# Patient Record
Sex: MALE | Race: WHITE
[De-identification: names, ages, dates, MRNs, and addresses within clinical notes are randomized per-mention and may not be internally consistent; named-entity substitution may affect disease eponyms.]

---

## 2018-12-15 ENCOUNTER — HOSPITAL ENCOUNTER (EMERGENCY)
Dept: HOSPITAL 74 - FER | Age: 23
Discharge: HOME | End: 2018-12-15
Payer: COMMERCIAL

## 2018-12-15 VITALS — TEMPERATURE: 98.4 F | HEART RATE: 66 BPM | DIASTOLIC BLOOD PRESSURE: 60 MMHG | SYSTOLIC BLOOD PRESSURE: 110 MMHG

## 2018-12-15 VITALS — BODY MASS INDEX: 27.4 KG/M2

## 2018-12-15 DIAGNOSIS — Z85.47: ICD-10-CM

## 2018-12-15 DIAGNOSIS — E86.0: ICD-10-CM

## 2018-12-15 DIAGNOSIS — K52.9: Primary | ICD-10-CM

## 2018-12-15 LAB
ALBUMIN SERPL-MCNC: 3.9 G/DL (ref 3.5–5)
ALP SERPL-CCNC: 50 U/L (ref 32–92)
ALT SERPL-CCNC: 25 U/L (ref 10–40)
ANION GAP SERPL CALC-SCNC: 6 MMOL/L (ref 8–16)
AST SERPL-CCNC: 24 U/L (ref 10–42)
BASOPHILS # BLD: 1.1 % (ref 0–2)
BILIRUB SERPL-MCNC: 0.5 MG/DL (ref 0.2–1)
BUN SERPL-MCNC: 20 MG/DL (ref 7–18)
CALCIUM SERPL-MCNC: 8.7 MG/DL (ref 8.4–10.2)
CHLORIDE SERPL-SCNC: 104 MMOL/L (ref 98–107)
CO2 SERPL-SCNC: 27 MMOL/L (ref 22–28)
CREAT SERPL-MCNC: 0.9 MG/DL (ref 0.6–1.3)
DEPRECATED RDW RBC AUTO: 11.8 % (ref 11.9–15.9)
EOSINOPHIL # BLD: 5.3 % (ref 0–4.5)
GLUCOSE SERPL-MCNC: 86 MG/DL (ref 74–106)
HCT VFR BLD CALC: 44.9 % (ref 35.4–49)
HGB BLD-MCNC: 14.5 GM/DL (ref 11.7–16.9)
INR BLD: 1.11 (ref 0.82–1.09)
LIPASE SERPL-CCNC: 123 U/L (ref 73–393)
LYMPHOCYTES # BLD: 31.1 % (ref 8–40)
MCH RBC QN AUTO: 29.9 PG (ref 25.7–33.7)
MCHC RBC AUTO-ENTMCNC: 32.4 G/DL (ref 32–35.9)
MCV RBC: 92.4 FL (ref 80–96)
MONOCYTES # BLD AUTO: 9.4 % (ref 3.8–10.2)
NEUTROPHILS # BLD: 53.1 % (ref 42.8–82.8)
PH UR: 6 [PH] (ref 4.5–8)
PLATELET # BLD AUTO: 130 K/MM3 (ref 134–434)
PMV BLD: 10.7 FL (ref 7.5–11.1)
POTASSIUM SERPLBLD-SCNC: 3.7 MMOL/L (ref 3.5–5.1)
PROT SERPL-MCNC: 6.8 G/DL (ref 6.4–8.3)
PT PNL PPP: 12.4 SEC (ref 10.2–13)
RBC # BLD AUTO: 4.86 M/MM3 (ref 4–5.6)
SODIUM SERPL-SCNC: 137 MMOL/L (ref 136–145)
SP GR UR: >= 1.03 (ref 1.01–1.03)
UROBILINOGEN UR STRIP-MCNC: 1 MG/DL (ref 0.2–1)
WBC # BLD AUTO: 5.2 K/MM3 (ref 4–10.8)

## 2018-12-15 PROCEDURE — 3E0337Z INTRODUCTION OF ELECTROLYTIC AND WATER BALANCE SUBSTANCE INTO PERIPHERAL VEIN, PERCUTANEOUS APPROACH: ICD-10-PCS

## 2018-12-15 NOTE — PDOC
History of Present Illness





- General


History Source: Patient


Exam Limitations: No Limitations





- History of Present Illness


Initial Comments: 





12/15/18 21:48





The patient is a 23 year old male, with no past medical history,, who presents 

to the emergency department with increasing periumbilical abdominal pain with 

associated madelaine colored stools since Monday. He states he returned from 

[place] on Wednesday, but developed his symptoms on Monday while in [place] after 

a night of drinking alcohol outside of his resort. He states he does not drink 

alcohol regularly, however, was with friends and drank a large amount of 

alcohol on Sunday night. He states his stools are formed, nonbloody, and madelaine 

colored as he points to the beige wall as reference. He states he has has 

decreased appetite since. He reportedly ate pizza at noon which prompted a 

bowel movement. He states eating anything makes him move his bowels. He states 

he drank pedialyte last night for hydration. He denies drinking from rivers or 

streams. 





The patient denies chest pain, shortness of breath, headache and dizziness. The 

patient denies fever, chills, nausea, vomit, diarrhea and constipation. The 

patient denies dysuria, frequency, urgency and hematuria. 





Allergies: Penicillins


PCP -  Dr. Spaulding











<Kusum Ruiz - Last Filed: 12/15/18 21:48>





<Marianela Cotto - Last Filed: 12/16/18 06:02>





- General


Chief Complaint: Diarrhea


Stated Complaint: ABDOMINAL PAIN X 6 DAYS


Time Seen by Provider: 12/15/18 21:16





Past History





<Kusum Ruiz - Last Filed: 12/15/18 21:48>





- Past Medical History


Cancer: Yes (TESTICULAR @ AGE 5)


COPD: No





- Immunization History


Td Vaccination: Yes


Immunization Up to Date: Yes





- Suicide/Smoking/Psychosocial Hx


Smoking Status: Yes


Smoking History: Never smoked


Number of Cigarettes Smoked Daily: 5


Hx Alcohol Use: Yes


Drug/Substance Use Hx: No


Substance Use Type: None


Hx Substance Use Treatment: No





<Marianela Cotto - Last Filed: 12/16/18 06:02>





- Past Medical History


Allergies/Adverse Reactions: 


 Allergies











Allergy/AdvReac Type Severity Reaction Status Date / Time


 


Penicillins Allergy   Verified 12/15/18 21:10











Home Medications: 


Ambulatory Orders





No Home Medications 0 dose .ROUTE UTDICT 02/01/13 











**Review of Systems





- Review of Systems


Able to Perform ROS?: Yes


Comments:: 





12/15/18 21:48


CONSTITUTIONAL:


(+) decreased appetite.Absent: fever, no chills, no fatigue


EYES:


Absent: visual changes


ENT:


Absent: ear pain, no sore throat


CARDIOVASCULAR:


Absent: chest pain, no palpitations


RESPIRATORY:


Absent: cough, no SOB


GI:


(+) abdominal pain, madelaine colored stool, Absent:  no nausea, no vomiting, no 

constipation, no diarrhea


GENITOURINARY:


Absent: dysuria, no frequency, no hematuria


MUSKULOSKELETAL:


Absent: back pain, no arthralgia, no myalgia


SKIN:


Absent: rash


NEURO:


Absent: headache








<Kusum Ruiz - Last Filed: 12/15/18 21:48>





*Physical Exam





- Vital Signs


 Last Vital Signs











Temp Pulse Resp BP Pulse Ox


 


 98.4 F   66   16   110/60   99 


 


 12/15/18 21:09  12/15/18 21:09  12/15/18 21:09  12/15/18 21:09  12/15/18 21:09














- Physical Exam


Comments: 





12/15/18 21:49





GENERAL: The patient is awake, alert, and fully oriented, in no acute distress.


HEAD: Normal with no signs of trauma.


EYES: Pupils equal, round and reactive to light, extraocular movements intact, 

sclera anicteric, conjunctiva clear with no pallor.


ENT: Ears normal, nares patent, oropharynx clear without exudates. Moist mucous 

membranes.


NECK: Normal range of motion, supple without lymphadenopathy, JVD, or masses.


LUNGS: Breath sounds equal, clear to auscultation bilaterally. No wheeze/

crackles.


HEART: Regular rate and rhythm, normal S1 and S2 without murmur or rub.


ABDOMEN:(+) mild tenderness of the epigastric, right upper quadrant and 

periumbilical regions. No iraheta sign. 


 Soft/nondistended. BS wnl. No guarding or rebound. No palpable masses. No 

hepatosplenomegaly.


EXTREMITIES: Normal range of motion, no edema. No clubbing or cyanosis. No cords

, erythema, or tenderness.


NEUROLOGICAL: Cranial nerves II through XII grossly intact. Normal speech, 

normal gait.


PSYCH: Normal mood, normal affect.


SKIN: Warm, Dry, normal turgor, no rashes or lesions noted.














<Kusum Ruiz - Last Filed: 12/15/18 21:48>





- Vital Signs


 Last Vital Signs











Temp Pulse Resp BP Pulse Ox


 


 98.4 F   66   16   110/60   99 


 


 12/15/18 21:09  12/15/18 21:09  12/15/18 21:09  12/15/18 21:09  12/15/18 21:09














<Marianela Cotto - Last Filed: 12/16/18 06:02>





Moderate Sedation





- Procedure Monitoring


Vital Signs: 


Procedure Monitoring Vital Signs











Temperature  98.4 F   12/15/18 21:09


 


Pulse Rate  66   12/15/18 21:09


 


Respiratory Rate  16   12/15/18 21:09


 


Blood Pressure  110/60   12/15/18 21:09


 


O2 Sat by Pulse Oximetry (%)  99   12/15/18 21:09











<Kusum Ruiz - Last Filed: 12/15/18 21:48>





- Procedure Monitoring


Vital Signs: 


Procedure Monitoring Vital Signs











Temperature  98.4 F   12/15/18 21:09


 


Pulse Rate  66   12/15/18 21:09


 


Respiratory Rate  16   12/15/18 21:09


 


Blood Pressure  110/60   12/15/18 21:09


 


O2 Sat by Pulse Oximetry (%)  99   12/15/18 21:09











<Marianela Cotto - Last Filed: 12/16/18 06:02>





ED Treatment Course





- LABORATORY


CBC & Chemistry Diagram: 


 12/15/18 21:40





 12/15/18 21:40





<Marianela Cotto - Last Filed: 12/16/18 06:02>





Progress Note





- Progress Note


Progress Note: 





Documentation has been prepared under my direction and personally reviewed by 

me in its entirety. I attest that this documented accurately reflects all work, 

treatment, procedures and medical decision making performed by me.





<Marianela Cotto - Last Filed: 12/16/18 06:02>





Medical Decision Making





- Medical Decision Making





As noted above, this 23 y.o man presents with upper abdominal pain,decreased 

appetite and madelaine-colored stools. No hx of jaundice.


Exam as noted. No significant abdominal tenderness.


Labs notable for normal WBC; bilirubin and transaminase levels are normal.


The patient is comfortable without further epigastric pain after IV fluid 

administratiom








<Marianela Cotto - Last Filed: 12/16/18 06:02>





*DC/Admit/Observation/Transfer





- Attestations


Scribe Attestion: 





12/15/18 21:49





Documentation prepared by Kusum Ruiz, acting as medical scribe for Marianela Cotto MD





<Kusum Ruiz - Last Filed: 12/15/18 21:48>





<Marianela Cotto - Last Filed: 12/16/18 06:02>


Diagnosis at time of Disposition: 


 Gastroenteritis, Dehydration








- Discharge Dispostion


Disposition: HOME


Condition at time of disposition: Improved





- Referrals


Referrals: 


Guillermo Spaulding MD [Primary Care Provider] - 3 days





- Patient Instructions


Printed Discharge Instructions:  Dehydration


Additional Instructions: 


continue hydration as discussed


advance diet as tolerated


followup with your doctor within 3 days


return to ER if you have worsening pain or develop fever/vomiting








- Post Discharge Activity

## 2019-04-22 ENCOUNTER — EMERGENCY (EMERGENCY)
Facility: HOSPITAL | Age: 24
LOS: 1 days | Discharge: HOME | End: 2019-04-22
Attending: EMERGENCY MEDICINE | Admitting: EMERGENCY MEDICINE
Payer: SELF-PAY

## 2019-04-22 VITALS
HEART RATE: 89 BPM | RESPIRATION RATE: 17 BRPM | DIASTOLIC BLOOD PRESSURE: 85 MMHG | SYSTOLIC BLOOD PRESSURE: 137 MMHG | OXYGEN SATURATION: 99 % | TEMPERATURE: 98 F

## 2019-04-22 VITALS
WEIGHT: 171.96 LBS | OXYGEN SATURATION: 97 % | TEMPERATURE: 98 F | RESPIRATION RATE: 19 BRPM | HEART RATE: 108 BPM | DIASTOLIC BLOOD PRESSURE: 85 MMHG | SYSTOLIC BLOOD PRESSURE: 133 MMHG | HEIGHT: 65 IN

## 2019-04-22 DIAGNOSIS — Z98.890 OTHER SPECIFIED POSTPROCEDURAL STATES: Chronic | ICD-10-CM

## 2019-04-22 PROCEDURE — 70450 CT HEAD/BRAIN W/O DYE: CPT | Mod: 26

## 2019-04-22 PROCEDURE — 90471 IMMUNIZATION ADMIN: CPT

## 2019-04-22 PROCEDURE — 12013 RPR F/E/E/N/L/M 2.6-5.0 CM: CPT

## 2019-04-22 PROCEDURE — 90715 TDAP VACCINE 7 YRS/> IM: CPT

## 2019-04-22 PROCEDURE — 99284 EMERGENCY DEPT VISIT MOD MDM: CPT | Mod: 25

## 2019-04-22 PROCEDURE — 70450 CT HEAD/BRAIN W/O DYE: CPT

## 2019-04-22 RX ORDER — TETANUS TOXOID, REDUCED DIPHTHERIA TOXOID AND ACELLULAR PERTUSSIS VACCINE, ADSORBED 5; 2.5; 8; 8; 2.5 [IU]/.5ML; [IU]/.5ML; UG/.5ML; UG/.5ML; UG/.5ML
0.5 SUSPENSION INTRAMUSCULAR ONCE
Qty: 0 | Refills: 0 | Status: COMPLETED | OUTPATIENT
Start: 2019-04-22 | End: 2019-04-22

## 2019-04-22 RX ADMIN — TETANUS TOXOID, REDUCED DIPHTHERIA TOXOID AND ACELLULAR PERTUSSIS VACCINE, ADSORBED 0.5 MILLILITER(S): 5; 2.5; 8; 8; 2.5 SUSPENSION INTRAMUSCULAR at 04:01

## 2019-04-22 NOTE — ED ADULT NURSE NOTE - CHIEF COMPLAINT QUOTE
patient presents ambulatory to triage, states "I was working on a car and it was lifted and when I went to bring the car down the bar rickashayed and hit me in the head" patient denies loss of consciousness, blurred vision, nausea, vomiting, shortness of breath, palpitations. unknown last tetanus vaccine as per patient   patient sustained laceration to right forehead/scalp line  patient c/o intermittent dizziness since event. patient presents ambulatory to triage, states "I was working on a car and it was lifted and when I went to bring the car down the bar rickashayed and hit me in the head" patient denies loss of consciousness, dizziness, blurred vision, nausea, vomiting, shortness of breath, palpitations. unknown last tetanus vaccine as per patient   patient sustained laceration to right forehead/scalp line

## 2019-04-22 NOTE — ED PROVIDER NOTE - OBJECTIVE STATEMENT
22yo male who presents with head injury and laceration. pt was at work and got hit in the head with a pole attached to a car tow, no LOC, pt c/o dizziness, headache, no vomiting, no nausea

## 2019-04-22 NOTE — ED ADULT NURSE NOTE - CAS ELECT INFOMATION PROVIDED
patient denies pain, dizziness, nausea, vomiting, shortness of breath, headache, blurred vision, chest pain, pain. steady gait noted. pt verbalized understanding of discharge instructions and follow up with primary health care provider and return to ED if symptoms worsen. patient educated to return in one week for suture removal as per Dr Dutton./DC instructions

## 2019-04-22 NOTE — ED ADULT NURSE NOTE - NSIMPLEMENTINTERV_GEN_ALL_ED
Implemented All Universal Safety Interventions:  [place] to call system. Call bell, personal items and telephone within reach. Instruct patient to call for assistance. Room bathroom lighting operational. Non-slip footwear when patient is off stretcher. Physically safe environment: no spills, clutter or unnecessary equipment. Stretcher in lowest position, wheels locked, appropriate side rails in place.

## 2019-04-22 NOTE — ED PROVIDER NOTE - CARE PLAN
Principal Discharge DX:	Laceration of forehead, right, complicated, initial encounter Principal Discharge DX:	Laceration of forehead, right, complicated, initial encounter  Secondary Diagnosis:	Head injuries, initial encounter

## 2019-04-22 NOTE — ED PROVIDER NOTE - PRINCIPAL DIAGNOSIS
all other ROS negative except as per HPI
Laceration of forehead, right, complicated, initial encounter

## 2019-04-22 NOTE — ED ADULT NURSE NOTE - OBJECTIVE STATEMENT
patient presents ambulatory to triage, states "I was working on a car and it was lifted and when I went to bring the car down the bar rickashayed and hit me in the head" patient denies loss of consciousness, dizziness, blurred vision, nausea, vomiting, shortness of breath, palpitations. unknown last tetanus vaccine as per patient   patient sustained laceration to right forehead/scalp line

## 2019-04-22 NOTE — ED ADULT TRIAGE NOTE - CHIEF COMPLAINT QUOTE
patient presents ambulatory to triage, states "I was working on a car and it was lifted and when I went to bring the car down the bar rickashayed and hit me in the head" patient denies loss of consciousness, blurred vision, nausea, vomiting, shortness of breath, palpitations. unknown last tetanus vaccine as per patient   patient sustained laceration to right forehead/scalp line  patient c/o intermittent dizziness since event.

## 2021-11-12 ENCOUNTER — HOSPITAL ENCOUNTER (EMERGENCY)
Dept: HOSPITAL 74 - FER | Age: 26
Discharge: HOME | End: 2021-11-12
Payer: COMMERCIAL

## 2021-11-12 VITALS — TEMPERATURE: 98.9 F | HEART RATE: 90 BPM | SYSTOLIC BLOOD PRESSURE: 138 MMHG | DIASTOLIC BLOOD PRESSURE: 93 MMHG

## 2021-11-12 VITALS — BODY MASS INDEX: 27.5 KG/M2

## 2021-11-12 DIAGNOSIS — S20.361A: Primary | ICD-10-CM
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
## 2022-04-18 ENCOUNTER — HOSPITAL ENCOUNTER (EMERGENCY)
Dept: HOSPITAL 74 - FER | Age: 27
Discharge: HOME | End: 2022-04-18
Payer: SELF-PAY

## 2022-04-18 VITALS — DIASTOLIC BLOOD PRESSURE: 65 MMHG | TEMPERATURE: 98.4 F | HEART RATE: 73 BPM | SYSTOLIC BLOOD PRESSURE: 114 MMHG

## 2022-04-18 VITALS — BODY MASS INDEX: 31.4 KG/M2

## 2022-04-18 DIAGNOSIS — M25.531: Primary | ICD-10-CM
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
## 2023-06-02 ENCOUNTER — HOSPITAL ENCOUNTER (EMERGENCY)
Dept: HOSPITAL 74 - FER | Age: 28
Discharge: HOME | End: 2023-06-02
Payer: SELF-PAY

## 2023-06-02 VITALS
DIASTOLIC BLOOD PRESSURE: 83 MMHG | TEMPERATURE: 98.2 F | RESPIRATION RATE: 20 BRPM | SYSTOLIC BLOOD PRESSURE: 127 MMHG | HEART RATE: 98 BPM

## 2023-06-02 VITALS — BODY MASS INDEX: 31.6 KG/M2

## 2023-06-02 DIAGNOSIS — M54.50: Primary | ICD-10-CM

## 2023-06-02 DIAGNOSIS — R19.7: ICD-10-CM

## 2023-06-02 DIAGNOSIS — R10.13: ICD-10-CM

## 2023-06-02 DIAGNOSIS — R10.32: ICD-10-CM
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
## 2023-06-02 DIAGNOSIS — A08.4: ICD-10-CM

## 2023-06-02 DIAGNOSIS — S16.1XXA: ICD-10-CM

## 2023-06-02 DIAGNOSIS — M54.2: ICD-10-CM

## 2023-06-02 LAB
ALBUMIN SERPL-MCNC: 3.7 G/DL (ref 3.4–5)
ALP SERPL-CCNC: 57 U/L (ref 45–117)
ALT SERPL-CCNC: 50 U/L (ref 13–61)
ANION GAP SERPL CALC-SCNC: 5 MMOL/L (ref 8–16)
AST SERPL-CCNC: 35 U/L (ref 15–37)
BILIRUB SERPL-MCNC: 0.6 MG/DL (ref 0.2–1)
BUN SERPL-MCNC: 19 MG/DL (ref 7–18)
CALCIUM SERPL-MCNC: 8.3 MG/DL (ref 8.5–10)
CHLORIDE SERPL-SCNC: 107 MMOL/L (ref 98–107)
CO2 SERPL-SCNC: 24 MMOL/L (ref 21–32)
CREAT SERPL-MCNC: 0.9 MG/DL (ref 0.55–1.3)
DEPRECATED RDW RBC AUTO: 13.4 % (ref 11.9–15.9)
GLUCOSE SERPL-MCNC: 88 MG/DL (ref 74–106)
HCT VFR BLD CALC: 43.9 % (ref 35.4–49)
HGB BLD-MCNC: 15.5 G/DL (ref 11.7–16.9)
MCH RBC QN AUTO: 31.4 PG (ref 25.7–33.7)
MCHC RBC AUTO-ENTMCNC: 35.3 G/DL (ref 32–35.9)
MCV RBC: 89 FL (ref 80–96)
PLATELET # BLD AUTO: 110.4 10^3/UL (ref 134–434)
PMV BLD: 10.3 FL (ref 7.5–11.1)
POTASSIUM SERPLBLD-SCNC: 4 MMOL/L (ref 3.5–5.1)
PROT SERPL-MCNC: 6.6 G/DL (ref 6.4–8.2)
RBC # BLD AUTO: 4.93 10^6/UL (ref 4–5.6)
SODIUM SERPL-SCNC: 136 MMOL/L (ref 136–145)
WBC # BLD AUTO: 6.8 10^3/UL (ref 4–10.8)

## 2023-06-02 PROCEDURE — 3E0333Z INTRODUCTION OF ANTI-INFLAMMATORY INTO PERIPHERAL VEIN, PERCUTANEOUS APPROACH: ICD-10-PCS

## 2023-06-02 PROCEDURE — 3E033GC INTRODUCTION OF OTHER THERAPEUTIC SUBSTANCE INTO PERIPHERAL VEIN, PERCUTANEOUS APPROACH: ICD-10-PCS

## 2023-06-02 PROCEDURE — 3E0337Z INTRODUCTION OF ELECTROLYTIC AND WATER BALANCE SUBSTANCE INTO PERIPHERAL VEIN, PERCUTANEOUS APPROACH: ICD-10-PCS
